# Patient Record
Sex: MALE | Race: WHITE
[De-identification: names, ages, dates, MRNs, and addresses within clinical notes are randomized per-mention and may not be internally consistent; named-entity substitution may affect disease eponyms.]

---

## 2017-10-10 ENCOUNTER — HOSPITAL ENCOUNTER (OUTPATIENT)
Dept: HOSPITAL 76 - DI | Age: 52
Discharge: HOME | End: 2017-10-10
Attending: OTOLARYNGOLOGY
Payer: OTHER GOVERNMENT

## 2017-10-10 DIAGNOSIS — R42: Primary | ICD-10-CM

## 2017-10-10 DIAGNOSIS — Z98.890: ICD-10-CM

## 2017-10-10 PROCEDURE — 70480 CT ORBIT/EAR/FOSSA W/O DYE: CPT

## 2017-10-11 NOTE — CT REPORT
EXAM:

CT TEMPORAL BONE

 

EXAM DATE: 10/10/2017 09:29 AM.

 

CLINICAL HISTORY: Vertigo, previous stapes surgery.

 

COMPARISON: None.

 

TECHNIQUE: Routine axial CT imaging performed through the temporal bones. IV contrast: None. Reconstr
uctions: Coronal, sagittal,Stenvers in bone algorithm.

 

In accordance with CT protocol optimization, one or more of the following dose reduction techniques w
ere utilized for this exam: automated exposure control, adjustment of mA and/or KV based on patient s
ize, or use of iterative reconstructive technique.

 

FINDINGS:

Right temporal bone:

External auditory canal, and mastoid air cells appear well aerated. Aditus ad antrum appear clear. Ep
itympanum, Prussak's space, cog and epitympanic recess appear normal. Ossicles are intact. The malleu
s, incus, and stapes are normally aligned, with normal ossicular relationship. Tympanic membrane appe
ar normal. Middle ear and hypotympanum appear normal. Sinus tympani, facial nerve recess appear rosa
l. Round window, and oval window niche appear normal. Semicircular canal, cochlea, and facial nerve c
anal appear normal. No apparent dehiscence of the superior semicircular canal. Internal auditory fabian
l appear normal. There is no apparent dilation of the vestibular aqueduct. Jugular bulb appear normal
. Carotid canal appear normal.

 

Left temporal bone:

Postsurgical changes of partial ossicular replacement prosthesis surgery. The incus is not visualized
. Stapes prosthesis extends from the residual malleus the oval window niche. The focus of increased d
ensity marking the distal margin of the stapes prosthesis projects at or outside the oval window. Cli
nical correlation with progressive conductive hearing loss suggested. Soft tissue debris projected la
teral to the lateral aspect of the stapes prosthesis and posterior to the manubrium of the malleus (a
xial image 29 series 16) measuring 3.1 x 3.4 mm likely deep to the tympanic membrane, of uncertain et
iology. Cholesteatoma cannot be completely excluded.

 

Otic capsule, membranous labyrinth, internal auditory canal, cochlea, lateral semicircular canal and 
superior semicircular canal appear intact. There is no evidence for dehiscence of the semicircular ca
nal. Vestibular aqueduct appear normal.

 

Mucosal thickening in the utilized bilateral maxillary sinus antra and near complete opacification of
 anterior ethmoid air cells/mucosal thickening in the frontoethmoidal recess. Postsurgical changes of
 bilateral maxillary sinus antrostomies. Minimal mucosal thickening in bilateral sphenoid sinus locul
es. Frothy secretions in the left sphenoid sinus locule could be correlated with acute sinusitis.

 

No suspicious lytic or sclerotic osseous lesions.

 

IMPRESSION: 

Left temporal bone:

1. 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
 residual malleus to the oval window. The hyperdensity marking the distal aspect of the stapes prosth
esis projects outside or at the margin of the oval window. Comparison with prior postoperative tempor
al bone study suggested. Clinical correlation for progressive conductive hearing loss suggested. Incu
s is not visualized and may have been resected or eroded.

2. 3 x 3 mm soft tissue projecting lateral to the stapes prosthesis and medial to the tympanic membra
ne, posterior to the malleus of uncertain etiology may be granulation tissue, cholesteatoma cannot be
 completely excluded.

 

Right temporal bone: Normal appearance of the right temporal bone.

 

Chronic changes of bilateral maxillary sinus antrostomies. Mucosal thickening in bilateral ethmoid ai
r cells and frontal ethmoidal recesses. Frothy secretions in the left sphenoid sinus locule, nonspeci
fic but could be correlated with acute sinusitis.

Referring Provider Line: [phone]

 

SITE ID: [id number]

## 2019-04-06 ENCOUNTER — HOSPITAL ENCOUNTER (EMERGENCY)
Dept: HOSPITAL 76 - ED | Age: 54
Discharge: HOME | End: 2019-04-06
Payer: COMMERCIAL

## 2019-04-06 VITALS — SYSTOLIC BLOOD PRESSURE: 146 MMHG | DIASTOLIC BLOOD PRESSURE: 100 MMHG

## 2019-04-06 DIAGNOSIS — Z23: ICD-10-CM

## 2019-04-06 DIAGNOSIS — E03.9: ICD-10-CM
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
## 2019-04-06 DIAGNOSIS — S61.215A: Primary | ICD-10-CM

## 2019-04-06 PROCEDURE — 99282 EMERGENCY DEPT VISIT SF MDM: CPT

## 2019-04-06 PROCEDURE — 90471 IMMUNIZATION ADMIN: CPT

## 2019-04-06 PROCEDURE — 99283 EMERGENCY DEPT VISIT LOW MDM: CPT

## 2019-04-06 NOTE — ED PHYSICIAN DOCUMENTATION
PD HPI UPPER EXT INJURY





- Stated complaint


Stated Complaint: FINGER LAC





- Chief complaint


Chief Complaint: Laceration





- History obtained from


History obtained from: Patient





- History of Present Illness


Location: Left, Finger (ring finger plamar distal pad)


Type of injury: Laceration (sharp edge at work)


Timing - onset: Today (He states it did bleed briskly initially but has stopped 

with direct pressure.  He does not feel he necessarily needs sutures in may be 

okay with Steri-Strips.  However he was concerned about cleaning it and also a 

tetanus booster.)


Timing - details: Abrupt onset


Worsened by: Moving


Associated symptoms: No: Weakness, Numbness





Review of Systems


Skin: reports: Laceration (s)


Neurologic: denies: Focal weakness, Numbness, Near syncope





PD PAST MEDICAL HISTORY





- Past Medical History


Endocrine/Autoimmune: HyPOthyroidism





- Past Surgical History


Past Surgical History: Yes





- Present Medications


Home Medications: 


                                Ambulatory Orders











 Medication  Instructions  Recorded  Confirmed


 


Levothyroxine [Synthroid]  04/19/14 04/19/14














- Allergies


Allergies/Adverse Reactions: 


                                    Allergies











Allergy/AdvReac Type Severity Reaction Status Date / Time


 


No Known Drug Allergies Allergy   Verified 04/06/19 09:41














- Social History


Does the pt smoke?: No


Smoking Status: Never smoker


Does the pt drink ETOH?: No





PD ED PE NORMAL





- Vitals


Vital signs reviewed: Yes





- General


General: Alert and oriented X 3, No acute distress, Well developed/nourished





- Derm


Derm: Normal color, Warm and dry





- Extremities


Extremities: Other (Left ring finger shows a laceration on the palmar aspect of 

the middle phalanx to the radial side.  It is crisp edges to goes down to the 

fatty tissue but minimally separates with range of motion.  There is no bleeding

 no foreign body noted.  Discussed Steri-Strips and glue with him and he is 

amenable to that.)





- Neuro


Neuro: Alert and oriented X 3, No motor deficit, No sensory deficit





Results





- Vitals


Vitals: 


                               Vital Signs - 24 hr











  04/06/19





  09:39


 


Temperature 36.3 C L


 


Heart Rate 53 L


 


Respiratory 18





Rate 


 


Blood Pressure 146/100 H


 


O2 Saturation 97








                                     Oxygen











O2 Source                      Room air

















Departure





- Departure


Disposition: 01 Home, Self Care


Clinical Impression: 


 Finger laceration





Condition: Stable


Record reviewed to determine appropriate education?: Yes


Instructions:  ED Laceration Hand


Comments: 


Keep the area clean and protected.  Allow the Steri-Strips to fall off on their 

own after couple of days.  He should then be able to just protect it with Band-

Aids.  Recheck if signs of infection.


Discharge Date/Time: 04/06/19 10:20

## 2019-11-11 ENCOUNTER — HOSPITAL ENCOUNTER (EMERGENCY)
Dept: HOSPITAL 76 - ED | Age: 54
Discharge: HOME | End: 2019-11-11
Payer: MEDICARE

## 2019-11-11 VITALS — SYSTOLIC BLOOD PRESSURE: 155 MMHG | DIASTOLIC BLOOD PRESSURE: 84 MMHG

## 2019-11-11 DIAGNOSIS — M70.52: Primary | ICD-10-CM

## 2019-11-11 PROCEDURE — 99282 EMERGENCY DEPT VISIT SF MDM: CPT

## 2019-11-11 PROCEDURE — 99283 EMERGENCY DEPT VISIT LOW MDM: CPT

## 2019-11-11 NOTE — ED PHYSICIAN DOCUMENTATION
PD HPI LOWER EXT INJURY





- Stated complaint


Stated Complaint: LT LEG PAIN - NO INJURY





- Chief complaint


Chief Complaint: Ext Problem





- History obtained from


History obtained from: Patient





- History of Present Illness


PD HPI LOW EXT INJURY LOCATION: Left, Knee


Type of injury: Other


Timing - onset: Yesterday (No known injury)


Timing - details: Abrupt onset


Pain level now: 8


Improved by: Immobilization, Meds


Worsened by: Moving, Palpating


Associated symptoms: Swelling, Discolored.  No: Numbness, Tingling


Similar symptoms before: Has not had sx before


Recently seen: Not recently seen





- Additional information


Additional information: 





Is a 54-year-old presents with his significant other complaints that he is 

having pain down the front of his left lower leg from just below the knee all 

the way down to the top of the ankle.  He says it "follows the redness" that is 

there.  He first noticed this yesterday when he was walking.  In the middle of 

the night he woke up in pain in his significant other gave him the naproxen that

seemed to help a little bit.  He came back again this morning and he took 

naproxen but only relieve the pain for about an hour.  He is tried elevating it 

but that actually seems to be worse just pressure pushing up behind the back of 

the knee.  He has not had any fever or chills.  He said very never had prior 

surgery on that knee.  He does get a lot of dry skin around the knees and is 

been rubbing them at night with lotion.  He has been very active bending and 

kneeling stooping working on a jeep.  He is not currently employed.  He is 

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





Review of Systems


Constitutional: denies: Fever, Chills, Sweats


Skin: reports: Rash


Musculoskeletal: reports: Extremity pain.  denies: Extremity swelling, Joint 

swelling


Neurologic: denies: Numbness





PD PAST MEDICAL HISTORY





- Past Medical History


Endocrine/Autoimmune: HyPOthyroidism





- Past Surgical History


Past Surgical History: Yes





- Present Medications


Home Medications: 


                                Ambulatory Orders











 Medication  Instructions  Recorded  Confirmed


 


Levothyroxine [Synthroid]  04/19/14 04/19/14


 


Hydrocodone/Acetaminophen 1 - 2 each PO Q6H PRN #10 tablet 11/11/19 





[Hydrocodon-Acetaminophen 5-325]   














- Allergies


Allergies/Adverse Reactions: 


                                    Allergies











Allergy/AdvReac Type Severity Reaction Status Date / Time


 


No Known Drug Allergies Allergy   Verified 04/06/19 09:41














- Social History


Does the pt smoke?: No


Smoking Status: Never smoker


Does the pt drink ETOH?: No





PD ED PE NORMAL





- Vitals


Vital signs reviewed: Yes





- General


General: Alert and oriented X 3, No acute distress, Well developed/nourished, 

Other (Very pleasant 54-year-old man.  He is laughing and joking.)





- HEENT


HEENT: Atraumatic, Moist mucous membranes





- Respiratory


Respiratory: No respiratory distress





- Derm


Derm: Other (Some faint erythema down the anterior tibial surface on the left 

lower leg.)





- Extremities


Extremities: No deformity, Other (He is a very prominent tibia or tibial 

tuberosity on both lower legs with some thickened skin over it.  The left one is

erythematous and boggy suggestive of bursitis.)





- Neuro


Neuro: Alert and oriented X 3, No motor deficit, No sensory deficit





- Psych


Psych: Normal mood, Normal affect





Results





- Vitals


Vitals: 





                               Vital Signs - 24 hr











  11/11/19





  17:10


 


Temperature 36.7 C


 


Heart Rate 89


 


Respiratory 16





Rate 


 


Blood Pressure 155/84 H


 


O2 Saturation 98








                                     Oxygen











O2 Source                      Room air

















PD MEDICAL DECISION MAKING





- ED course


Complexity details: d/w patient, d/w family


ED course: 





Patient's exam and history are consistent with bursitis.  We discussed the 

mechanisms of this and the expected healing process.  I do not see any evidence 

of infection at this point.  He is to continue taking naproxen twice a day.  I 

did give him a prescription for normal 10 hydrocodone tablets that he can use at

night if he needs to for additional pain.  Icing this may help with the biggest 

issue is avoiding repetitive movement or kneeling on the knee.  He states 

understanding.  Encouraged to follow-up if things are worsening or develops a 

fever.





Departure





- Departure


Disposition: 01 Home, Self Care


Clinical Impression: 


Bursitis


Qualifiers:


 Bursitis location: knee Knee bursitis location: infrapatellar bursitis 

Laterality: left Qualified Code(s): M70.52 - Other bursitis of knee, left knee





Condition: Good


Instructions:  ED Bursitis


Follow-Up: 


[facility] [Provider Group]


Prescriptions: 


Hydrocodone/Acetaminophen [Hydrocodon-Acetaminophen 5-325] 1 - 2 each PO Q6H PRN

#10 tablet


 PRN Reason: pain


Comments: 


Take the naproxen twice a day for the next 7 to 10 days.  You can use the 

hydrocodone if needed at night to help with any additional pain but do not drive

or operate machinery if you take that medication.  Ice can help slow the 

inflammation process.  Avoid repetitive activity or kneeling down onto the knee.

 Return if the pain is increasing or you develop a fever or other problems 

arise.